# Patient Record
Sex: FEMALE | Race: WHITE
[De-identification: names, ages, dates, MRNs, and addresses within clinical notes are randomized per-mention and may not be internally consistent; named-entity substitution may affect disease eponyms.]

---

## 2018-04-11 ENCOUNTER — HOSPITAL ENCOUNTER (EMERGENCY)
Dept: HOSPITAL 14 - H.ER | Age: 58
Discharge: HOME | End: 2018-04-11
Payer: SELF-PAY

## 2018-04-11 VITALS
HEART RATE: 68 BPM | TEMPERATURE: 98.2 F | RESPIRATION RATE: 20 BRPM | OXYGEN SATURATION: 98 % | DIASTOLIC BLOOD PRESSURE: 87 MMHG | SYSTOLIC BLOOD PRESSURE: 143 MMHG

## 2018-04-11 DIAGNOSIS — R13.10: ICD-10-CM

## 2018-04-11 DIAGNOSIS — E03.9: ICD-10-CM

## 2018-04-11 DIAGNOSIS — J02.9: Primary | ICD-10-CM

## 2018-04-11 NOTE — ED PDOC
HPI: General Adult


Time Seen by Provider: 04/11/18 13:55


Chief Complaint (Nursing): ENT Problem


Chief Complaint (Provider): ENT Problem


History Per: Patient


History/Exam Limitations: no limitations


Onset/Duration Of Symptoms: Days (x1)


Have you had recent travel within the past 21 days to any of the following 

countries: [place], [place], [place] or [place]?: No


Current Symptoms Are (Timing): Still Present


Additional Complaint(s): 


56 y/o female with a history of hypothyroidism presents to the ED with trouble 

swallowing. Patient states it began last night when she felt pain with 

swallowing. She denies any fever, cough, or chills. 





PMD: None provided








Past Medical History


Reviewed: Historical Data, Nursing Documentation, Vital Signs


Vital Signs: 





 Last Vital Signs











Temp  98.8 F   04/11/18 13:32


 


Pulse  80   04/11/18 13:32


 


Resp  16   04/11/18 13:32


 


BP  145/74   04/11/18 13:32


 


Pulse Ox  97   04/11/18 13:32














- Medical History


PMH: Hypothyroidism





- Surgical History


Surgical History: No Surg Hx





- Family History


Family History: States: No Known Family Hx





- Social History


Current smoker - smoking cessation education provided: No


Ex-Smoker (has not smoked in the last 12 months): No


Alcohol: None


Drugs: Denies





- Allergies


Allergies/Adverse Reactions: 


 Allergies











Allergy/AdvReac Type Severity Reaction Status Date / Time


 


No Known Allergies Allergy   Verified 04/11/18 13:32














Review of Systems


ROS Statement: Except As Marked, All Systems Reviewed And Found Negative


Constitutional: Negative for: Fever, Chills


ENT: Positive for: Throat Pain


Respiratory: Negative for: Cough





Physical Exam





- Reviewed


Nursing Documentation Reviewed: Yes





- Physical Exam


Appears: Positive for: Well, Non-toxic, No Acute Distress


Head Exam: Positive for: ATRAUMATIC, NORMAL INSPECTION, NORMOCEPHALIC


Skin: Positive for: Normal Color, Warm, DRY


Eye Exam: Positive for: EOMI, Normal appearance, PERRL


ENT: Positive for: Pharynx Is (erythemic)


Neck: Positive for: Normal, Painless ROM


Cardiovascular/Chest: Positive for: Regular Rate, Rhythm.  Negative for: Murmur


Respiratory: Positive for: Normal Breath Sounds.  Negative for: Respiratory 

Distress


Gastrointestinal/Abdominal: Positive for: Normal Exam, Soft


Back: Positive for: Normal Inspection.  Negative for: L CVA Tenderness, R CVA 

Tenderness, Vertebral Tenderness


Extremity: Positive for: Normal ROM.  Negative for: Pedal Edema, Deformity


Neurologic/Psych: Positive for: Alert, Oriented (x3).  Negative for: Motor/

Sensory Deficits





- ECG


O2 Sat by Pulse Oximetry: 97 (RA)


Pulse Ox Interpretation: Normal





Medical Decision Making


Medical Decision Making: 


Time: 13:32





Initial Impression: Sore throat, rule out strep throat





Initial Plan;


* Motrin 600 mg PO








--------------------------------------------------------------------------------

-----------------


Scribe Attestation:


Documented by Ana Cuellar acting as a scribe for Nancy Landers MD.





MD Scribe Attestation: 


All medical record entries made by the Scribe were at my direction and 

personally dictated by me. I have reviewed the chart and agree that the record 

accurately reflects my personal performance of the history, physical exam, 

medical decision making, and the department course for this patient. I have 

also personally directed, reviewed, and agree with the discharge instructions 

and disposition.








Disposition





- Disposition

## 2018-05-14 ENCOUNTER — HOSPITAL ENCOUNTER (OUTPATIENT)
Dept: HOSPITAL 14 - H.OPSURG | Age: 58
Discharge: HOME | End: 2018-05-14
Attending: FAMILY MEDICINE
Payer: SELF-PAY

## 2018-05-14 VITALS — DIASTOLIC BLOOD PRESSURE: 96 MMHG | SYSTOLIC BLOOD PRESSURE: 148 MMHG | HEART RATE: 70 BPM

## 2018-05-14 VITALS — RESPIRATION RATE: 18 BRPM

## 2018-05-14 VITALS — OXYGEN SATURATION: 99 % | TEMPERATURE: 98.2 F

## 2018-05-14 VITALS — BODY MASS INDEX: 26.5 KG/M2

## 2018-05-14 DIAGNOSIS — E04.2: Primary | ICD-10-CM

## 2018-05-14 NOTE — PCM.[email or web address]
Surgeon's Initial Post Op Note





- Surgeon's Notes


Surgeon: Cuba Lee MD


Assistant: NONE


Type of Anesthesia: Local


Pre-Operative Diagnosis: Thyroid nodules


Operative Findings: US showed bilateral complex right and left thyroid nodules


Post-Operative Diagnosis: Thyroid nodules


Operation Performed: US guided FNA of right and left thyroid nodules


Specimen/Specimens Removed: 25 g FNA X  4 passes per nodule


Estimated Blood Loss: EBL {In ML}: 1


Blood Products Given: N/A


Drains Used: No Drains


Post-Op Condition: Fair


Date of Surgery/Procedure: 05/14/18


Time of Surgery/Procedure: 15:00

## 2018-05-14 NOTE — US
PROCEDURE:  Date of Procedure:  05/14/2018



PROCEDURE: 



1. Ultrasound guided FNA of left thyroid nodule, CPT 91247



2. Ultrasound guided FNA of RIGHT thyroid nodule, 



3. Ultrasound guidance for FNA, 52989



Medications: 6cc 1% Lidocaine







HISTORY:

 Enlarged thyroid nodules. 



TECHNIQUE:

Following informed consent and procedure time-out, a limited 

ultrasound patient's neck confirmed the presence of a 2.4  cm complex 

mixed solid and cystic left thyroid nodule. Also present is a complex,

 predominantly solid right thyroid nodule measuring 2.8 cm.



After the patient's neck was prepped and draped in the usual sterile 

fashion, the skin was anesthetized with 1% lidocaine.  

Ultrasound-guided fine needle aspiration was then performed of the 

dominant left thyroid nodule. A total of 4 passes were made into the 

nodule with 25 gauge needle under ultrasound guidance.  The FNA 

specimen was sent for routine pathology. 



Ultrasound guided FNA was then performed of the dominant right 

thyroid nodule.  Again 4 passes were made into the nodule with 25 

gauge needle. The FNA specimen was sent for routine pathology. 



Post biopsy ultrasound showed no hematoma. 



IMPRESSION:





Ultrasound-guided FNA of the dominant right and left thyroid nodules.

## 2018-05-14 NOTE — CP.SDSHP
Same Day Surgery [facility] & [facility]





- History


Proposed Procedure: US guided FNA of thyroid nodules


Pre-Op Diagnosis: Left and right thyroid nodules





- Allergies


Allergies: 


Allergies





No Known Allergies Allergy (Verified 04/11/18 13:32)


 











- Physical Exam


Vital Signs: 


 Vital Signs











  05/14/18 05/14/18





  13:33 13:36


 


Temperature 98.1 F 


 


Pulse Rate 89 89


 


Respiratory 18 





Rate  


 


Blood Pressure 153/81 H 


 


O2 Sat by Pulse 98 





Oximetry  














- Impression


Impression: Pt with bilateral thyroid nodules. Plan US guided FNA.


Pt. Evaluated Today:Candidate for Anesthesia & Procedure: No





- Date & Time


Date: 05/14/18


Time: 14:20





Short Stay Discharge





- Short Stay Discharge


Admitting Diagnosis/Reason for Visit: E04.2

## 2019-01-14 ENCOUNTER — HOSPITAL ENCOUNTER (EMERGENCY)
Dept: HOSPITAL 14 - H.ER | Age: 59
Discharge: HOME | End: 2019-01-14
Payer: SELF-PAY

## 2019-01-14 VITALS — TEMPERATURE: 97.9 F | OXYGEN SATURATION: 100 % | RESPIRATION RATE: 18 BRPM

## 2019-01-14 VITALS — BODY MASS INDEX: 26.4 KG/M2

## 2019-01-14 VITALS — DIASTOLIC BLOOD PRESSURE: 81 MMHG | HEART RATE: 75 BPM | SYSTOLIC BLOOD PRESSURE: 134 MMHG

## 2019-01-14 DIAGNOSIS — E03.9: ICD-10-CM

## 2019-01-14 DIAGNOSIS — M17.0: Primary | ICD-10-CM

## 2019-01-14 NOTE — ED PDOC
Lower Extremity Pain/Injury


Time Seen by Provider: 01/14/19 10:25


Chief Complaint (Nursing): Lower Extremity Problem/Injury


History Per: Patient


Onset/Duration Of Symptoms: Days (2)


Current Symptoms Are (Timing): Still Present


Severity: Moderate


Additional Complaint(s): 





Bilat knee pain x 2 days left > right. Fell 2 days ago when left knee gave out 

with injury to left knee. Has had chronic pain both knees. Denies dizziness 

prior to fall. No focal weakness.





Past Medical History


Vital Signs: 





                                Last Vital Signs











Temp  97.9 F   01/14/19 10:12


 


Pulse  80   01/14/19 10:12


 


Resp  18   01/14/19 10:12


 


BP  170/79 H  01/14/19 10:12


 


Pulse Ox  100   01/14/19 10:12














- Medical History


PMH: Hypothyroidism





- Family History


Family History: States: Unknown Family Hx





- Home Medications


Home Medications: 


                                Ambulatory Orders











 Medication  Instructions  Recorded


 


Naproxen [Naprosyn] 500 mg PO Q12H #20 tab 01/14/19














- Allergies


Allergies/Adverse Reactions: 


                                    Allergies











Allergy/AdvReac Type Severity Reaction Status Date / Time


 


No Known Allergies Allergy   Verified 01/14/19 10:24














Review of Systems


Cardiovascular: Negative for: Chest Pain, Palpitations


Musculoskeletal: Positive for: Other (Knee pain)


Neurological: Negative for: Weakness, Numbness





Physical Exam





- Physical Exam


Appears: Positive for: No Acute Distress


Cardiovascular/Chest: Positive for: Regular Rate, Rhythm


Extremity: Positive for: Normal ROM, Tenderness (Lateral aspect left knee)





- ECG


O2 Sat by Pulse Oximetry: 100





Disposition





- Clinical Impression


Clinical Impression: 


 Arthritis








- Patient ED Disposition


Is Patient to be Admitted: No


Counseled Patient/Family Regarding: Studies Performed, Diagnosis, Need For 

Followup, Rx Given





- Disposition


Referrals: 


Yoselin Zacarias MD [Staff Provider] - 


Disposition: Routine/Home


Disposition Time: 12:40


Condition: FAIR


Prescriptions: 


Naproxen [Naprosyn] 500 mg PO Q12H #20 tab


Instructions:  Osteoarthritis


Forms:  [employer] (English)

## 2019-01-14 NOTE — RAD
Date of service: 



01/14/2019



PROCEDURE:  Bilateral Knee Radiographs.



HISTORY:

Trauma



COMPARISON:

[id number]



FINDINGS:



BONES:

Bone alignment is normal.  There is no acute displaced fracture or 

bone destruction.



There is diffuse bone demineralization.  There is a subarticular 

lucency in the left medial femoral condyles which may represent a 

large geode. 



JOINTS:

There is severe tricompartmental degenerative osteoarthrosis with 

reduced joint spaces, marginal osteophytes, subarticular cystic 

changes and tibial spiking, worse in the medial compartment. 



SOFT TISSUES:

Right Knee: Normal.



Left Knee: Normal.



JOINT EFFUSION:

There are small suprapatellar joint effusions. 



OTHER FINDINGS:

None.



IMPRESSION:

No acute displaced fracture or dislocation.



Severe tricompartmental degenerative osteoarthrosis, worse in the 

medial compartment.  Suprapatellar joint effusions.